# Patient Record
Sex: FEMALE | Race: OTHER
[De-identification: names, ages, dates, MRNs, and addresses within clinical notes are randomized per-mention and may not be internally consistent; named-entity substitution may affect disease eponyms.]

---

## 2018-02-26 ENCOUNTER — HOSPITAL ENCOUNTER (OUTPATIENT)
Dept: HOSPITAL 61 - 3 SO LND | Age: 23
Setting detail: OBSERVATION
Discharge: HOME | End: 2018-02-26
Attending: SPECIALIST | Admitting: SPECIALIST
Payer: SELF-PAY

## 2018-02-26 DIAGNOSIS — O26.893: Primary | ICD-10-CM

## 2018-02-26 DIAGNOSIS — Z3A.35: ICD-10-CM

## 2018-02-26 DIAGNOSIS — R10.9: ICD-10-CM

## 2018-02-26 LAB
ADD MAN DIFF?: NO
ALBUMIN SERPL-MCNC: 2.5 G/DL (ref 3.4–5)
ALBUMIN/GLOB SERPL: 0.7 {RATIO} (ref 1–1.7)
ALP SERPL-CCNC: 179 U/L (ref 46–116)
ALT (SGPT): 15 U/L (ref 14–59)
AMPHETAMINE/METHAMPHETAMINE: (no result)
ANION GAP SERPL CALC-SCNC: 10 MMOL/L (ref 6–14)
AST SERPL-CCNC: 16 U/L (ref 15–37)
BACTERIA,URINE: (no result) /HPF
BARBITURATES: (no result)
BASO #: 0.1 X10^3/UL (ref 0–0.2)
BASO %: 1 % (ref 0–3)
BENZODIAZEPINES: (no result)
BILIRUBIN,URINE: NEGATIVE
BLOOD UREA NITROGEN: 12 MG/DL (ref 7–20)
BUN/CREAT SERPL: 24 (ref 6–20)
CALCIUM: 8.3 MG/DL (ref 8.5–10.1)
CANNABINOIDS: (no result)
CHLORIDE: 106 MMOL/L (ref 98–107)
CLARITY,URINE: (no result)
CO2 SERPL-SCNC: 23 MMOL/L (ref 21–32)
COCAINE: (no result)
COLOR,URINE: YELLOW
CREAT SERPL-MCNC: 0.5 MG/DL (ref 0.6–1)
EOS #: 0.3 X10^3/UL (ref 0–0.7)
EOS %: 3 % (ref 0–3)
ETHANOL, URINE: (no result)
GFR SERPLBLD BASED ON 1.73 SQ M-ARVRAT: 154.3 ML/MIN
GLOBULIN SER-MCNC: 3.7 G/DL (ref 2.2–3.8)
GLUCOSE SERPL-MCNC: 80 MG/DL (ref 70–99)
GLUCOSE,URINE: NEGATIVE MG/DL
HCG SERPL-ACNC: 12.8 X10^3/UL (ref 4–11)
HEMATOCRIT: 34.2 % (ref 36–47)
HEMOGLOBIN: 11.7 G/DL (ref 12–15.5)
LYMPH #: 2.2 X10^3/UL (ref 1–4.8)
LYMPH %: 17 % (ref 24–48)
MEAN CORPUSCULAR HEMOGLOBIN: 32 PG (ref 25–35)
MEAN CORPUSCULAR HGB CONC: 34 G/DL (ref 31–37)
MEAN CORPUSCULAR VOLUME: 92 FL (ref 79–100)
METHADONE: (no result)
MONO #: 1 X10^3/UL (ref 0–1.1)
MONO %: 8 % (ref 0–9)
NEUT #: 9.2 X10^3UL (ref 1.8–7.7)
NEUT %: 72 % (ref 31–73)
NITRITE,URINE: POSITIVE
OPIATES: (no result)
PH,URINE: 6
PHENCYCLIDINE: (no result)
PLATELET COUNT: 227 X10^3/UL (ref 140–400)
POTASSIUM SERPL-SCNC: 3.8 MMOL/L (ref 3.5–5.1)
PROTEIN,URINE: NEGATIVE MG/DL
RBC,URINE: (no result) /HPF (ref 0–2)
RED BLOOD COUNT: 3.7 X10^6/UL (ref 3.5–5.4)
RED CELL DISTRIBUTION WIDTH: 12.5 % (ref 11.5–14.5)
SODIUM: 139 MMOL/L (ref 136–145)
SPECIFIC GRAVITY,URINE: 1.02
SQUAMOUS EPITHELIAL CELL,UR: (no result) /LPF
TOTAL BILIRUBIN: 0.1 MG/DL (ref 0.2–1)
TOTAL PROTEIN: 6.2 G/DL (ref 6.4–8.2)
UROBILINOGEN,URINE: 0.2 MG/DL
WBC,URINE: (no result) /HPF (ref 0–4)

## 2018-02-26 PROCEDURE — 36415 COLL VENOUS BLD VENIPUNCTURE: CPT

## 2018-02-26 PROCEDURE — 86593 SYPHILIS TEST NON-TREP QUANT: CPT

## 2018-02-26 PROCEDURE — 86850 RBC ANTIBODY SCREEN: CPT

## 2018-02-26 PROCEDURE — 96361 HYDRATE IV INFUSION ADD-ON: CPT

## 2018-02-26 PROCEDURE — 86900 BLOOD TYPING SEROLOGIC ABO: CPT

## 2018-02-26 PROCEDURE — 81001 URINALYSIS AUTO W/SCOPE: CPT

## 2018-02-26 PROCEDURE — 86706 HEP B SURFACE ANTIBODY: CPT

## 2018-02-26 PROCEDURE — 96365 THER/PROPH/DIAG IV INF INIT: CPT

## 2018-02-26 PROCEDURE — 87086 URINE CULTURE/COLONY COUNT: CPT

## 2018-02-26 PROCEDURE — 86762 RUBELLA ANTIBODY: CPT

## 2018-02-26 PROCEDURE — 76805 OB US >/= 14 WKS SNGL FETUS: CPT

## 2018-02-26 PROCEDURE — 80053 COMPREHEN METABOLIC PANEL: CPT

## 2018-02-26 PROCEDURE — 87186 SC STD MICRODIL/AGAR DIL: CPT

## 2018-02-26 PROCEDURE — 80307 DRUG TEST PRSMV CHEM ANLYZR: CPT

## 2018-02-26 PROCEDURE — 86901 BLOOD TYPING SEROLOGIC RH(D): CPT

## 2018-02-26 PROCEDURE — 85025 COMPLETE CBC W/AUTO DIFF WBC: CPT

## 2018-02-26 RX ADMIN — SODIUM CHLORIDE, SODIUM LACTATE, POTASSIUM CHLORIDE, AND CALCIUM CHLORIDE 1 MLS/HR: .6; .31; .03; .02 INJECTION, SOLUTION INTRAVENOUS at 13:31

## 2018-02-28 LAB — RUBELLA IGG ANTIBODY: 1.95 INDEX

## 2018-03-13 VITALS — DIASTOLIC BLOOD PRESSURE: 73 MMHG | SYSTOLIC BLOOD PRESSURE: 109 MMHG

## 2019-11-06 ENCOUNTER — HOSPITAL ENCOUNTER (OUTPATIENT)
Dept: HOSPITAL 61 - 3 SO LND | Age: 24
Setting detail: OBSERVATION
Discharge: HOME | End: 2019-11-06
Attending: OBSTETRICS & GYNECOLOGY | Admitting: OBSTETRICS & GYNECOLOGY
Payer: SELF-PAY

## 2019-11-06 DIAGNOSIS — R10.30: ICD-10-CM

## 2019-11-06 DIAGNOSIS — O99.89: ICD-10-CM

## 2019-11-06 DIAGNOSIS — Z3A.32: ICD-10-CM

## 2019-11-06 DIAGNOSIS — M25.519: ICD-10-CM

## 2019-11-06 DIAGNOSIS — O26.893: Primary | ICD-10-CM

## 2019-11-06 DIAGNOSIS — M54.9: ICD-10-CM

## 2019-11-06 LAB
APTT PPP: YELLOW S
BACTERIA #/AREA URNS HPF: (no result) /HPF
BILIRUB UR QL STRIP: NEGATIVE
FIBRINOGEN PPP-MCNC: CLEAR MG/DL
NITRITE UR QL STRIP: NEGATIVE
PH UR STRIP: 6 [PH]
PROT UR STRIP-MCNC: NEGATIVE MG/DL
RBC #/AREA URNS HPF: 0 /HPF (ref 0–2)
SQUAMOUS #/AREA URNS LPF: (no result) /LPF
UROBILINOGEN UR-MCNC: 0.2 MG/DL
WBC #/AREA URNS HPF: (no result) /HPF (ref 0–4)

## 2019-11-06 PROCEDURE — G0379 DIRECT REFER HOSPITAL OBSERV: HCPCS

## 2019-11-06 PROCEDURE — G0378 HOSPITAL OBSERVATION PER HR: HCPCS

## 2019-11-06 PROCEDURE — 87086 URINE CULTURE/COLONY COUNT: CPT

## 2019-11-06 PROCEDURE — 81001 URINALYSIS AUTO W/SCOPE: CPT

## 2019-12-04 ENCOUNTER — HOSPITAL ENCOUNTER (INPATIENT)
Dept: HOSPITAL 61 - 3 SO LND | Age: 24
LOS: 2 days | Discharge: HOME | End: 2019-12-06
Attending: OBSTETRICS & GYNECOLOGY | Admitting: OBSTETRICS & GYNECOLOGY
Payer: SELF-PAY

## 2019-12-04 VITALS — HEIGHT: 65 IN | WEIGHT: 174 LBS | BODY MASS INDEX: 28.99 KG/M2

## 2019-12-04 VITALS — DIASTOLIC BLOOD PRESSURE: 56 MMHG | SYSTOLIC BLOOD PRESSURE: 97 MMHG

## 2019-12-04 VITALS — SYSTOLIC BLOOD PRESSURE: 92 MMHG | DIASTOLIC BLOOD PRESSURE: 54 MMHG

## 2019-12-04 VITALS — SYSTOLIC BLOOD PRESSURE: 100 MMHG | DIASTOLIC BLOOD PRESSURE: 61 MMHG

## 2019-12-04 VITALS — DIASTOLIC BLOOD PRESSURE: 55 MMHG | SYSTOLIC BLOOD PRESSURE: 91 MMHG

## 2019-12-04 DIAGNOSIS — Z3A.36: ICD-10-CM

## 2019-12-04 LAB
BASOPHILS # BLD AUTO: 0.1 X10^3/UL (ref 0–0.2)
BASOPHILS NFR BLD: 1 % (ref 0–3)
EOSINOPHIL NFR BLD: 0.3 X10^3/UL (ref 0–0.7)
EOSINOPHIL NFR BLD: 3 % (ref 0–3)
ERYTHROCYTE [DISTWIDTH] IN BLOOD BY AUTOMATED COUNT: 12.8 % (ref 11.5–14.5)
HCT VFR BLD CALC: 37.6 % (ref 36–47)
HGB BLD-MCNC: 12.7 G/DL (ref 12–15.5)
LYMPHOCYTES # BLD: 3.5 X10^3/UL (ref 1–4.8)
LYMPHOCYTES NFR BLD AUTO: 29 % (ref 24–48)
MCH RBC QN AUTO: 32 PG (ref 25–35)
MCHC RBC AUTO-ENTMCNC: 34 G/DL (ref 31–37)
MCV RBC AUTO: 93 FL (ref 79–100)
MONO #: 1.2 X10^3/UL (ref 0–1.1)
MONOCYTES NFR BLD: 10 % (ref 0–9)
NEUT #: 6.8 X10^3/UL (ref 1.8–7.7)
NEUTROPHILS NFR BLD AUTO: 57 % (ref 31–73)
PLATELET # BLD AUTO: 237 X10^3/UL (ref 140–400)
RBC # BLD AUTO: 4.03 X10^6/UL (ref 3.5–5.4)
WBC # BLD AUTO: 11.9 X10^3/UL (ref 4–11)

## 2019-12-04 PROCEDURE — 36415 COLL VENOUS BLD VENIPUNCTURE: CPT

## 2019-12-04 PROCEDURE — 87340 HEPATITIS B SURFACE AG IA: CPT

## 2019-12-04 PROCEDURE — 86850 RBC ANTIBODY SCREEN: CPT

## 2019-12-04 PROCEDURE — G0378 HOSPITAL OBSERVATION PER HR: HCPCS

## 2019-12-04 PROCEDURE — 90715 TDAP VACCINE 7 YRS/> IM: CPT

## 2019-12-04 PROCEDURE — 88307 TISSUE EXAM BY PATHOLOGIST: CPT

## 2019-12-04 PROCEDURE — 90471 IMMUNIZATION ADMIN: CPT

## 2019-12-04 PROCEDURE — 86900 BLOOD TYPING SEROLOGIC ABO: CPT

## 2019-12-04 PROCEDURE — 86762 RUBELLA ANTIBODY: CPT

## 2019-12-04 PROCEDURE — 86901 BLOOD TYPING SEROLOGIC RH(D): CPT

## 2019-12-04 PROCEDURE — 86592 SYPHILIS TEST NON-TREP QUAL: CPT

## 2019-12-04 PROCEDURE — 90686 IIV4 VACC NO PRSV 0.5 ML IM: CPT

## 2019-12-04 PROCEDURE — 85025 COMPLETE CBC W/AUTO DIFF WBC: CPT

## 2019-12-04 RX ADMIN — IBUPROFEN PRN MG: 400 TABLET ORAL at 20:31

## 2019-12-04 RX ADMIN — OXYCODONE HYDROCHLORIDE AND ACETAMINOPHEN PRN TAB: 5; 325 TABLET ORAL at 20:35

## 2019-12-04 RX ADMIN — IBUPROFEN PRN MG: 400 TABLET ORAL at 06:10

## 2019-12-04 RX ADMIN — OXYCODONE HYDROCHLORIDE AND ACETAMINOPHEN PRN TAB: 5; 325 TABLET ORAL at 15:00

## 2019-12-04 NOTE — PDOC1
OB - History


Hx of Present Pregnancy


Prenatal Care:  Limited Care


Ultrasounds:  Normal mid trimester US


Obstetrical Complications:  None


Medical Complications:  None





Past Family/Social History


*


Past Medical, Surgical, Family and Obstetric Histories reviewed from prenatal 

chart.


Blood Type:  Unknown


Rubella:  Unknown


RPR/VDRL:  Unknown


GBS Status:  Unknown


HBsAG:  Unknown





OB - Chief Complaint & HPI


Date of Admission:


Date of Admission:  Dec 4, 2019 at 04:43


Chief Complaint/History


:  4


Para:  3


EGA:  36


Reason for admission:   labor


Admission Nurse Assessment Rev:  Yes





OB - Admission Exam


Physical Exam


HEENT:  Normal


Heart:  Regular Rate


Lungs:  Clear, Equal


Abdomen:  Gravid, Non tender, Soft


Extremities:  Edema


Reflexes:  Normal


Cervical Dilatation:  9cm


Effacement:  100%


Station:  -1


Membranes:  Intact


Fetal Heart Rate:  Normal


Accelerations:  Accelerations Present


Decelerations:  No decelerations


Contractions on Admission:  < 5 Minutes Apart


Intensity:  Firm


Text


A: 36 wks IUP


    PTL 


    GBS unknown


P: Admit PTL management.











SANTOS MIDDLETON Jr, MD           Dec 4, 2019 05:34

## 2019-12-04 NOTE — PDOC
VAGINAL DELIVERY


DATE


DATE: 19 


TIME: 05:35


:  4


Para:  4


EGA:  36


VAGINAL DELIVERY:  VTX


VACCUM ASSISTED:  Yes


PLACENTA:  Spontaneous


APGAR


8/9


SEX:  Male


WEIGHT


Weight [ 4 lbs. 12 oz ]


Nuchal Cord:  No


Amniotic Fluid:  Clear


PAIN:  Natural


EPISIOTOMY:  No


EXTENSION:  No


EBL


300 ml


COMPLICATIONS


none


CONDITION


pt. stable


Signs of Intrauterine Infectio:  None


Shoulder Dystocia:  No











SANTOS MIDDLETON Jr, MD           Dec 4, 2019 05:36

## 2019-12-05 VITALS — DIASTOLIC BLOOD PRESSURE: 68 MMHG | SYSTOLIC BLOOD PRESSURE: 102 MMHG

## 2019-12-05 VITALS — DIASTOLIC BLOOD PRESSURE: 64 MMHG | SYSTOLIC BLOOD PRESSURE: 106 MMHG

## 2019-12-05 VITALS — SYSTOLIC BLOOD PRESSURE: 104 MMHG | DIASTOLIC BLOOD PRESSURE: 63 MMHG

## 2019-12-05 LAB
BASOPHILS # BLD AUTO: 0.1 X10^3/UL (ref 0–0.2)
BASOPHILS NFR BLD: 1 % (ref 0–3)
EOSINOPHIL NFR BLD: 0.5 X10^3/UL (ref 0–0.7)
EOSINOPHIL NFR BLD: 4 % (ref 0–3)
ERYTHROCYTE [DISTWIDTH] IN BLOOD BY AUTOMATED COUNT: 13 % (ref 11.5–14.5)
HCT VFR BLD CALC: 36.3 % (ref 36–47)
HGB BLD-MCNC: 12.6 G/DL (ref 12–15.5)
LYMPHOCYTES # BLD: 3.8 X10^3/UL (ref 1–4.8)
LYMPHOCYTES NFR BLD AUTO: 33 % (ref 24–48)
MCH RBC QN AUTO: 32 PG (ref 25–35)
MCHC RBC AUTO-ENTMCNC: 35 G/DL (ref 31–37)
MCV RBC AUTO: 93 FL (ref 79–100)
MONO #: 1 X10^3/UL (ref 0–1.1)
MONOCYTES NFR BLD: 9 % (ref 0–9)
NEUT #: 6.3 X10^3/UL (ref 1.8–7.7)
NEUTROPHILS NFR BLD AUTO: 54 % (ref 31–73)
PLATELET # BLD AUTO: 230 X10^3/UL (ref 140–400)
RBC # BLD AUTO: 3.9 X10^6/UL (ref 3.5–5.4)
WBC # BLD AUTO: 11.7 X10^3/UL (ref 4–11)

## 2019-12-05 RX ADMIN — IBUPROFEN PRN MG: 400 TABLET ORAL at 06:27

## 2019-12-05 RX ADMIN — OXYCODONE HYDROCHLORIDE AND ACETAMINOPHEN PRN TAB: 5; 325 TABLET ORAL at 06:29

## 2019-12-05 RX ADMIN — OXYCODONE HYDROCHLORIDE AND ACETAMINOPHEN PRN TAB: 5; 325 TABLET ORAL at 20:29

## 2019-12-05 RX ADMIN — IBUPROFEN PRN MG: 400 TABLET ORAL at 18:10

## 2019-12-05 RX ADMIN — ACETAMINOPHEN PRN MG: 325 TABLET, FILM COATED ORAL at 20:29

## 2019-12-05 RX ADMIN — DOCUSATE SODIUM PRN MG: 100 CAPSULE, LIQUID FILLED ORAL at 18:10

## 2019-12-05 NOTE — PDOC
OB Progress Note


Date of Service


19


Time of Evaluation


0820


Notes


Pt. feeling well.  No complaints.


Lab





Laboratory Tests








Test


 19


05:05 19


05:12


 


White Blood Count


 11.9 x10^3/uL


(4.0-11.0) 11.7 x10^3/uL


(4.0-11.0)


 


Red Blood Count


 4.03 x10^6/uL


(3.50-5.40) 3.90 x10^6/uL


(3.50-5.40)


 


Hemoglobin


 12.7 g/dL


(12.0-15.5) 12.6 g/dL


(12.0-15.5)


 


Hematocrit


 37.6 %


(36.0-47.0) 36.3 %


(36.0-47.0)


 


Mean Corpuscular Volume 93 fL ()  93 fL () 


 


Mean Corpuscular Hemoglobin 32 pg (25-35)  32 pg (25-35) 


 


Mean Corpuscular Hemoglobin


Concent 34 g/dL


(31-37) 35 g/dL


(31-37)


 


Red Cell Distribution Width


 12.8 %


(11.5-14.5) 13.0 %


(11.5-14.5)


 


Platelet Count


 237 x10^3/uL


(140-400) 230 x10^3/uL


(140-400)


 


Neutrophils (%) (Auto) 57 % (31-73)  54 % (31-73) 


 


Lymphocytes (%) (Auto) 29 % (24-48)  33 % (24-48) 


 


Monocytes (%) (Auto) 10 % (0-9)  9 % (0-9) 


 


Eosinophils (%) (Auto) 3 % (0-3)  4 % (0-3) 


 


Basophils (%) (Auto) 1 % (0-3)  1 % (0-3) 


 


Neutrophils # (Auto)


 6.8 x10^3/uL


(1.8-7.7) 6.3 x10^3/uL


(1.8-7.7)


 


Lymphocytes # (Auto)


 3.5 x10^3/uL


(1.0-4.8) 3.8 x10^3/uL


(1.0-4.8)


 


Monocytes # (Auto)


 1.2 x10^3/uL


(0.0-1.1) 1.0 x10^3/uL


(0.0-1.1)


 


Eosinophils # (Auto)


 0.3 x10^3/uL


(0.0-0.7) 0.5 x10^3/uL


(0.0-0.7)


 


Basophils # (Auto)


 0.1 x10^3/uL


(0.0-0.2) 0.1 x10^3/uL


(0.0-0.2)


 


Treponema pallidum Antibody


 Nonreactive


(Nonreactive) 





 


Hepatitis B Surface Antigen


 Nonreactive


(Nonreactive) 





 


Rubella IgG Antibody


 2.04 index


(Immune >0.99) 











Laboratory Tests








Test


 19


05:12


 


White Blood Count


 11.7 x10^3/uL


(4.0-11.0)


 


Red Blood Count


 3.90 x10^6/uL


(3.50-5.40)


 


Hemoglobin


 12.6 g/dL


(12.0-15.5)


 


Hematocrit


 36.3 %


(36.0-47.0)


 


Mean Corpuscular Volume 93 fL () 


 


Mean Corpuscular Hemoglobin 32 pg (25-35) 


 


Mean Corpuscular Hemoglobin


Concent 35 g/dL


(31-37)


 


Red Cell Distribution Width


 13.0 %


(11.5-14.5)


 


Platelet Count


 230 x10^3/uL


(140-400)


 


Neutrophils (%) (Auto) 54 % (31-73) 


 


Lymphocytes (%) (Auto) 33 % (24-48) 


 


Monocytes (%) (Auto) 9 % (0-9) 


 


Eosinophils (%) (Auto) 4 % (0-3) 


 


Basophils (%) (Auto) 1 % (0-3) 


 


Neutrophils # (Auto)


 6.3 x10^3/uL


(1.8-7.7)


 


Lymphocytes # (Auto)


 3.8 x10^3/uL


(1.0-4.8)


 


Monocytes # (Auto)


 1.0 x10^3/uL


(0.0-1.1)


 


Eosinophils # (Auto)


 0.5 x10^3/uL


(0.0-0.7)


 


Basophils # (Auto)


 0.1 x10^3/uL


(0.0-0.2)








Medications





Current Medications


Sodium Chloride (Normal Saline Flush) 3 ml QSHIFT  PRN IV AFTER MEDS AND BLOOD 

DRAWS;  Start 19 at 05:00


Ringer's Solution 1,000 ml @  125 mls/hr Q8H IV  Last administered on 19at 

06:11;  Start 19 at 05:00


Nalbuphine HCl (Nubain) 10 mg PRN Q1HR  PRN IV Severe labor pain;  Start 19

at 05:00;  Stop 19 at 16:10;  Status DC


Butorphanol Tartrate (Stadol) 2 mg PRN Q1HR  PRN IV Severe labor pain;  Start 

19 at 05:00;  Stop 19 at 16:10;  Status DC


Fentanyl Citrate (Fentanyl 2ml Vial) 100 mcg PRN Q20MIN  PRN IV Labor pain;  St

art 19 at 05:00;  Stop 19 at 16:10;  Status DC


Acetaminophen (Tylenol) 650 mg PRN Q6HRS  PRN PO MILD PAIN / TEMP;  Start 

19 at 05:00;  Stop 19 at 16:09;  Status DC


Ondansetron HCl (Zofran) 4 mg PRN Q4HRS  PRN IV NAUSEA/VOMITING;  Start 19 

at 05:00


Terbutaline Sulfate (Brethine) 0.25 mg 1X PRN  PRN SQ SEE COMMENTS;  Start 

19 at 05:00;  Stop 19 at 04:59;  Status DC


Lidocaine HCl (Xylocaine 1% Pf 30ml Vial) 30 ml 1X PRN  PRN INJ SEE COMMENTS;  

Start 19 at 05:00;  Stop 19 at 04:59


Oxytocin/Sodium Chloride 500 ml @ 0 mls/hr CONT  PRN IV SEE I/O RECORD;  Start 

19 at 05:00


Oxytocin/Sodium Chloride 500 ml @ 0 mls/hr CONT PRN  PRN IV Post delivery 

bleeding;  Start 19 at 05:00


Penicillin G Potassium [id number] unit/Dextrose 100 ml @  100 mls/hr 1X  ONCE IV  

Last administered on 19at 06:12;  Start 19 at 05:00;  Stop 19 at 

05:59;  Status DC


Penicillin G Potassium [id number] unit/Dextrose 50 ml @  100 mls/hr Q4H IV ;  Start

19 at 09:00;  Stop 19 at 16:11;  Status DC


Sodium Chloride (Normal Saline Flush) 10 ml QSHIFT  PRN IV AFTER MEDS AND BLOOD 

DRAWS;  Start 19 at 05:45


Oxytocin/Sodium Chloride 500 ml @  62.5 mls/hr CONT  PRN IV SEE I/O RECORD;  

Start 19 at 05:45;  Stop 19 at 13:44;  Status DC


Acetaminophen (Tylenol) 650 mg PRN Q6HRS  PRN PO MILD PAIN / TEMP;  Start 

19 at 05:45


Ibuprofen (Motrin) 800 mg PRN Q8HRS  PRN PO INFLAMMATION/PAIN PREVENTION Last 

administered on 19at 06:27;  Start 19 at 05:45


Docusate Sodium (Colace) 100 mg PRN BID  PRN PO HARD STOOLS;  Start 19 at 

05:45


Magnesium Hydroxide (Milk Of Magnesia) 2,400 mg PRN DAILY  PRN PO CONSTIPATION; 

Start 19 at 05:45


Al Hydroxide/Mg Hydroxide (Mylanta Plus Xs) 30 ml PRN Q4HRS  PRN PO HEARTBURN / 

GAS;  Start 19 at 05:45


Simethicone (Gas-X) 80 mg PRN AFTMEALHC  PRN PO GAS / BLOATING;  Start 19 

at 05:45


Diphenhydramine HCl (Benadryl) 25 mg PRN Q6HRS  PRN PO ITCHING;  Start 19 

at 05:45


Benzocaine (Americaine) 1 spray PRN QID  PRN TP TOPICAL PAIN Last administered 

on 19at 06:13;  Start 19 at 05:45


Phenyleph/Shark Oil/Min Oil/Petrol (Preparation H) 1 ravi PRN QID  PRN RC RECTAL 

PAIN;  Start 19 at 05:45


Hydrocortisone (Cortaid) 1 ravi PRN QID  PRN TP PERINEAL PAIN;  Start 19 at 

05:45


Ferrous Sulfate (Feosol) 325 mg BIDWMEALS PO ;  Start 19 at 08:00


Zolpidem Tartrate (Ambien) 5 mg PRN QHS  PRN PO INSOMNIA, MAY REPEAT X1;  Start 

19 at 05:45


Info (Do NOT chart on this placeholder) 1 ea 1X PRN  PRN MC SEE COMMENTS;  Start

19 at 05:45


Info (Do NOT chart on this placeholder) 1 ea 1X PRN  PRN MC SEE COMMENTS;  Start

19 at 05:45


Oxycodone/ Acetaminophen (Percocet 5/325) 2 tab PRN Q4HRS  PRN PO MODERATE PAIN,

SEVERE PAIN Last administered on 19at 06:29;  Start 19 at 05:45





Active Scripts


Active


Ibuprofen 800 Mg Tablet 800 Mg PO PRN Q6HRS PRN


Reported


Prenatal Tablet (Pnv Cmb#95/Ferrous Fumarate/Fa) 1 Each Tablet 1 Tab PO DAILY


Exam


Abd: soft, non tender, fundus firm


Assessment


PPD#1 s/p 


Plan of Care:  Continue current Tx, Mgmt











SANTOS MIDDLETON Jr, MD           Dec 5, 2019 08:20

## 2019-12-06 VITALS — SYSTOLIC BLOOD PRESSURE: 110 MMHG | DIASTOLIC BLOOD PRESSURE: 68 MMHG

## 2019-12-06 VITALS — DIASTOLIC BLOOD PRESSURE: 62 MMHG | SYSTOLIC BLOOD PRESSURE: 111 MMHG

## 2019-12-06 RX ADMIN — IBUPROFEN PRN MG: 400 TABLET ORAL at 06:24

## 2019-12-06 RX ADMIN — ACETAMINOPHEN PRN MG: 325 TABLET, FILM COATED ORAL at 08:12

## 2019-12-06 RX ADMIN — DOCUSATE SODIUM PRN MG: 100 CAPSULE, LIQUID FILLED ORAL at 08:11

## 2019-12-06 NOTE — PATHOLOGY
[facility] Accession Number: [id number]

.                                                                01

Material submitted:                                        .

placenta - PLACENTA WITH CORD

.                                                                01

Clinical history:                                          .

Gestational age 36.6 weeks; prematurity; precipitous delivery; SGA; ;

EDC 19; Apgar 7, 8, 9

.                                                                02

**********************************************************************

Diagnosis:

478 gram late  placenta of an estimated 36.6 weeks gestation with

attached membranes and umbilical cord:

- Intervillous thrombi.

- Congestion of chorionic villi with focally increased syncytial knots.

LBQ  2019  1656 Local

**********************************************************************

.                                                                02

Comment:

There is no evidence of an acute chorioamnionitis or villitis.  There are

no infarcts.

(JPM/db; 2019)

.                                                                02

Electronically signed:                                     .

Jorge Alberto Mcmahon MD, Pathologist

NPI- [id number]

.                                                                01

Gross description:                                         .

The specimen is received in formalin, labeled "Joe Romero,

placenta".  Received is a rodrigues placenta with attached fetal membranes

and umbilical cord with a trimmed placental weight of 478 g and measuring

16.2 x 13.8 x 3.8 cm in greatest dimensions.  The fetal membranes are pale

tan, translucent, and the site of membrane rupture is 6.1 cm from the

placental margin.  The fetal surface is intact displaying a normal

arborizing vasculature pattern.  The trivascular umbilical cord measures

49.5 cm in length by 1.1 cm in diameter and inserts eccentrically, 1.5 cm

from the closest placental margin.  The umbilical cord is pale tan to

blue-gray in appearance with moderate to severe helical twisting.  The

maternal surface is intact and complete; a slight amount of adherent blood

coagulum is seen on the surface.  Sectioning reveals red-brown cut

surfaces displaying two lesions, the largest of which is hemorrhagic in

appearance, measuring 0.9 and 1.8 cm.  The lesions encompass less than 5%

of the total placental volume.  The specimen is submitted representatively

as follows:

.

A1     proximal umbilical cord and surface vessels

A2     umbilical cord and membrane roll

A3-A4  representative sections of maternal surface to include each lesion.

(CAA; 2019)

QAC/QAC  2019  1602 Local

.                                                                02

Pathologist provided ICD-10:

O43.893, Z37.0, Z3A.36

.                                                                02

CPT                                                        .

171617

Specimen Comment: A courtesy copy of this report has been sent to [phone]

Specimen Comment: Report sent to 

***Performed at:  01

   [facility]

   [address], [place]  [id number]

   MD Eliezer Blandon MD Phone:  [phone]

***Performed at:  02

   [facility]

   [address] [place], [place]  [id number]

   MD Jorge Alberto Mcmahon MD Phone:  [phone]

## 2019-12-06 NOTE — NUR
Discharge

Discharge instructions given to patient at this time, no questions or concerns. Baby no 
discharged at this time, boarding policy given to patient, signed and placed in chart.

## 2019-12-06 NOTE — DISCH
DISCHARGE INSTRUCTIONS


Condition on Discharge


Condition on Discharge:  Stable





Activity After Discharge


Activity Instructions for Disc:  Activity as tolerated


Lifting Instructions after Dis:  No heavy lifting


Driving Instructions after Dis:  Do not drive today





Diet after Discharge


Diet after Discharge:  Regular





Contacting the [facility] after DC


Call your doctor for:  If your condition worsens





Follow-Up


Follow up with:  Ashely in 6 wks











SANTOS MIDDLETON Jr, MD           Dec 6, 2019 16:28

## 2019-12-06 NOTE — PDOC3
OB DISCHARGE SUMMARY


DATE OF ADMISSION:  


12/4/19


DATE OF DISCHARGE:  


12/6/19


REASON FOR ADMISSION:  Onset of labor


INTRAPARTUM PROCEDURES:  Spontanous Vag Deliv


DISCHARGE DIAGNOSIS:  Term Pregnancy Delivered


DISCHARGE INFORMATION:  Activity (ad len), Diet (regular), Instructions (pelvic 

rest x 6 wks)


HOSPITAL COURSE


Term gestation delivered vaginally without complications.











SANTOS MIDDLETON Jr, MD           Dec 6, 2019 16:25

## 2021-09-29 ENCOUNTER — HOSPITAL ENCOUNTER (EMERGENCY)
Dept: HOSPITAL 61 - ER | Age: 26
Discharge: HOME | End: 2021-09-29
Payer: SELF-PAY

## 2021-09-29 VITALS — DIASTOLIC BLOOD PRESSURE: 83 MMHG | SYSTOLIC BLOOD PRESSURE: 134 MMHG

## 2021-09-29 VITALS — BODY MASS INDEX: 34.89 KG/M2 | HEIGHT: 65 IN | WEIGHT: 209.44 LBS

## 2021-09-29 DIAGNOSIS — R22.42: ICD-10-CM
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
## 2021-09-29 DIAGNOSIS — S05.12XA: Primary | ICD-10-CM
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
## 2021-09-29 PROCEDURE — 70486 CT MAXILLOFACIAL W/O DYE: CPT

## 2021-09-29 PROCEDURE — 70450 CT HEAD/BRAIN W/O DYE: CPT

## 2021-09-29 NOTE — PHYS DOC
Past Medical History


Past Medical History:  No Pertinent History


Past Surgical History:  No Surgical History


Smoking Status:  Never Smoker


Alcohol Use:  Rarely


Drug Use:  None





General Adult


EDM:


Chief Complaint:  EYE PROBLEMS





HPI:


HPI:





Patient is a 26 year old Turkmen speaking female who presents with left orbital 

swelling and pain.  Patient states that she was walking around a pool last night

and she fell.  She states that it was dark and she was not able to see well so 

she tripped and hit her face on the border of the pool.  She rates her pain 7/10

limited to the left periorbital area. Patient denies changes in visual acuity 

and visual field deficits.  She denies loss of consciousness, nausea, vomiting 

and other injury.





Review of Systems:


Review of Systems:


ROS negative except as mentioned in HPI.





Heart Score:


C/O Chest Pain:  No





Current Medications:





Current Medications








 Medications


  (Trade)  Dose


 Ordered  Sig/Sharmila  Start Time


 Stop Time Status Last Admin


Dose Admin


 


 Ibuprofen


  (Motrin)  800 mg  1X  ONCE  9/29/21 17:30


 9/29/21 17:31 DC 9/29/21 17:36


800 MG











Allergies:


Allergies:





Allergies








Coded Allergies Type Severity Reaction Last Updated Verified


 


  No Known Drug Allergies    10/9/17 No











Physical Exam:


PE:





Constitutional: Well developed, well nourished, no acute distress, non-toxic 

appearance. 


HENT: Normocephalic and symmetrical, bilateral external ears normal, oropharynx 

moist, no oral exudates, nose normal. 


Eyes: Visual acuity intact.  Bilateral PERRLA, EOMI (pain with superior lateral 

movement to left eye).  Left eye with periorbital swelling and ecchymosis, no 

hyphema. Right eye without swelling, conjunctiva normal.


Neck: Normal range of motion, no step-offs, no bony tenderness, supple, no 

stridor.


Cardiovascular: Heart rate regular rhythm, no murmur.


Lungs & Thorax:  Bilateral breath sounds clear to auscultation.


Back: No step-offs, no bony tenderness.


Extremities: No tenderness, no cyanosis, no clubbing, ROM intact, no edema.


Neurologic: Alert and oriented x3, normal motor function, normal sensory 

function, no focal deficits noted.





Current Patient Data:


Vital Signs:





                                   Vital Signs








  Date Time  Temp Pulse Resp B/P (MAP) Pulse Ox O2 Delivery O2 Flow Rate FiO2


 


9/29/21 16:13 98.1 103 18 134/83 (100) 98 Room Air  





 98.1       











Radiology/Procedures:


Radiology/Procedures:


PROCEDURE: CT HEAD AND MAXILLOFACIAL WO





CT HEAD AND MAXILLOFACIAL WO





History: Reason: face trauma / Spl. Instructions:  / History: . Pain





Comparison: None.





Technique: Noncontrast CT imaging was performed of the head and maxillofacial. 

Coronal and sagittal reconstructions were performed. 





Exposure: One or more of the following individualized dose reduction techniques 

were utilized for this examination:  


1. Automated exposure control  


2. Adjustment of the mA and/or kV according to patient size  


3. Use of iterative reconstruction technique.





Findings: 


Head CT: No intracranial hemorrhage. No mass effect. No hydrocephalus.  Extra-

axial spaces are unremarkable.





Maxillofacial CT: Left preseptal periorbital facial soft tissue swelling.





Orbits are unremarkable. 





Left maxillary sinus mucosal thickening. No acute calvarial fracture.





Impression:


Head CT:


1.  No acute intracranial abnormality. 





Maxillofacial CT:


1.  No acute maxillofacial fracture.


2.  Left preseptal periorbital and facial soft tissue swelling.





Electronically signed by: Samuel Reyez DO (9/29/2021 6:03 PM) [facility]





Course & Med Decision Making:


Course & Med Decision Making


Pertinent Labs and Imaging studies reviewed. (See chart for details)





Due to nature of initial injury with significant swelling to the periorbital 

area and pain with eye movement, maxillofacial and head CT ordered to rule out 

blowout fracture,ocular muscle entrapment, globe rupture, retrobulbar hematoma.





Patient is reassured that there are no bony injuries or internal injury.  She 

has instruction to follow-up with ophthalmology tomorrow to schedule an 

appointment.  In the meantime, she is instructed to use ice packs and 

over-the-counter NSAIDs patient understands and is agreeable to discharge plan.





Dragon Disclaimer:


Dragon Disclaimer:


This electronic medical record was generated, in whole or in part, using a voice

recognition dictation system.





Departure


Departure


Impression:  


   Primary Impression:  


   Contusion of left orbit


   Qualified Codes:  S05.12XA - Contusion of eyeball and orbital tissues, left 

   eye, initial encounter


   Additional Impression:  


   Orbital swelling


Disposition:  01 HOME / SELF CARE / HOMELESS


Condition:  STABLE


Referrals:  


UNKNOWN PCP NAME (PCP)


Patient Instructions:  Eye Contusion, Easy-to-Read





Additional Instructions:  


Por favor llama al oftalmologo en la manana para mas evaluacion y tratamiento. 

Si tu dolor o vision empeora, regresa a la igor de emergencia. Balbina 800mg 

ibuprofen/advil cada 6 horas para la inflamacion y dolor. 





[facility]


[address] [address]


[phone]











JAYNA CALDWELL              Sep 29, 2021 18:28

## 2021-09-29 NOTE — RAD
CT HEAD AND MAXILLOFACIAL WO



History: Reason: face trauma / Spl. Instructions:  / History: . Pain



Comparison: None.



Technique: Noncontrast CT imaging was performed of the head and maxillofacial. Coronal and sagittal r
econstructions were performed. 



Exposure: One or more of the following individualized dose reduction techniques were utilized for thi
s examination:  

1. Automated exposure control  

2. Adjustment of the mA and/or kV according to patient size  

3. Use of iterative reconstruction technique.



Findings: 

Head CT: No intracranial hemorrhage. No mass effect. No hydrocephalus.  Extra-axial spaces are unrema
rkable.



Maxillofacial CT: Left preseptal periorbital facial soft tissue swelling.



Orbits are unremarkable. 



Left maxillary sinus mucosal thickening. No acute calvarial fracture.



Impression:

Head CT:

1.  No acute intracranial abnormality. 



Maxillofacial CT:

1.  No acute maxillofacial fracture.

2.  Left preseptal periorbital and facial soft tissue swelling.



Electronically signed by: Samuel Reyez DO (9/29/2021 6:03 PM) [facility]

## 2022-01-23 ENCOUNTER — HOSPITAL ENCOUNTER (EMERGENCY)
Dept: HOSPITAL 61 - ER | Age: 27
Discharge: HOME | End: 2022-01-23
Payer: SELF-PAY

## 2022-01-23 VITALS — SYSTOLIC BLOOD PRESSURE: 110 MMHG | DIASTOLIC BLOOD PRESSURE: 70 MMHG

## 2022-01-23 VITALS — BODY MASS INDEX: 30.58 KG/M2 | WEIGHT: 190.26 LBS | HEIGHT: 66 IN

## 2022-01-23 DIAGNOSIS — O23.42: Primary | ICD-10-CM

## 2022-01-23 DIAGNOSIS — N39.0: ICD-10-CM

## 2022-01-23 DIAGNOSIS — Z3A.19: ICD-10-CM

## 2022-01-23 DIAGNOSIS — R10.12: ICD-10-CM

## 2022-01-23 LAB
ALBUMIN SERPL-MCNC: 2.9 G/DL (ref 3.4–5)
ALBUMIN/GLOB SERPL: 0.7 {RATIO} (ref 1–1.7)
ALP SERPL-CCNC: 114 U/L (ref 46–116)
ALT SERPL-CCNC: 22 U/L (ref 14–59)
ANION GAP SERPL CALC-SCNC: 12 MMOL/L (ref 6–14)
APTT PPP: YELLOW S
AST SERPL-CCNC: 11 U/L (ref 15–37)
BACTERIA #/AREA URNS HPF: (no result) /HPF
BASOPHILS # BLD AUTO: 0.1 X10^3/UL (ref 0–0.2)
BASOPHILS NFR BLD: 1 % (ref 0–3)
BILIRUB SERPL-MCNC: 0.2 MG/DL (ref 0.2–1)
BILIRUB UR QL STRIP: NEGATIVE
BUN SERPL-MCNC: 8 MG/DL (ref 7–20)
BUN/CREAT SERPL: 13 (ref 6–20)
CALCIUM SERPL-MCNC: 9.4 MG/DL (ref 8.5–10.1)
CHLORIDE SERPL-SCNC: 106 MMOL/L (ref 98–107)
CO2 SERPL-SCNC: 22 MMOL/L (ref 21–32)
CREAT SERPL-MCNC: 0.6 MG/DL (ref 0.6–1)
EOSINOPHIL NFR BLD: 0.2 X10^3/UL (ref 0–0.7)
EOSINOPHIL NFR BLD: 3 % (ref 0–3)
ERYTHROCYTE [DISTWIDTH] IN BLOOD BY AUTOMATED COUNT: 12.3 % (ref 11.5–14.5)
FIBRINOGEN PPP-MCNC: (no result) MG/DL
GFR SERPLBLD BASED ON 1.73 SQ M-ARVRAT: 120.8 ML/MIN
GLUCOSE SERPL-MCNC: 92 MG/DL (ref 70–99)
HCT VFR BLD CALC: 34.9 % (ref 36–47)
HGB BLD-MCNC: 12.1 G/DL (ref 12–15.5)
LIPASE: 92 U/L (ref 73–393)
LYMPHOCYTES # BLD: 1.6 X10^3/UL (ref 1–4.8)
LYMPHOCYTES NFR BLD AUTO: 18 % (ref 24–48)
MCH RBC QN AUTO: 32 PG (ref 25–35)
MCHC RBC AUTO-ENTMCNC: 35 G/DL (ref 31–37)
MCV RBC AUTO: 92 FL (ref 79–100)
MONO #: 0.6 X10^3/UL (ref 0–1.1)
MONOCYTES NFR BLD: 7 % (ref 0–9)
NEUT #: 6.5 X10^3/UL (ref 1.8–7.7)
NEUTROPHILS NFR BLD AUTO: 72 % (ref 31–73)
NITRITE UR QL STRIP: NEGATIVE
PH UR STRIP: 7 [PH]
PLATELET # BLD AUTO: 273 X10^3/UL (ref 140–400)
POTASSIUM SERPL-SCNC: 3.6 MMOL/L (ref 3.5–5.1)
PROT SERPL-MCNC: 6.9 G/DL (ref 6.4–8.2)
PROT UR STRIP-MCNC: NEGATIVE MG/DL
RBC # BLD AUTO: 3.81 X10^6/UL (ref 3.5–5.4)
RBC #/AREA URNS HPF: 0 /HPF (ref 0–2)
SODIUM SERPL-SCNC: 140 MMOL/L (ref 136–145)
UROBILINOGEN UR-MCNC: 0.2 MG/DL
WBC # BLD AUTO: 9 X10^3/UL (ref 4–11)
WBC #/AREA URNS HPF: (no result) /HPF (ref 0–4)

## 2022-01-23 PROCEDURE — 83690 ASSAY OF LIPASE: CPT

## 2022-01-23 PROCEDURE — 81025 URINE PREGNANCY TEST: CPT

## 2022-01-23 PROCEDURE — 87086 URINE CULTURE/COLONY COUNT: CPT

## 2022-01-23 PROCEDURE — 96361 HYDRATE IV INFUSION ADD-ON: CPT

## 2022-01-23 PROCEDURE — 85025 COMPLETE CBC W/AUTO DIFF WBC: CPT

## 2022-01-23 PROCEDURE — 96374 THER/PROPH/DIAG INJ IV PUSH: CPT

## 2022-01-23 PROCEDURE — 84702 CHORIONIC GONADOTROPIN TEST: CPT

## 2022-01-23 PROCEDURE — 76815 OB US LIMITED FETUS(S): CPT

## 2022-01-23 PROCEDURE — 80053 COMPREHEN METABOLIC PANEL: CPT

## 2022-01-23 PROCEDURE — 81001 URINALYSIS AUTO W/SCOPE: CPT

## 2022-01-23 PROCEDURE — 99285 EMERGENCY DEPT VISIT HI MDM: CPT

## 2022-01-23 PROCEDURE — 36415 COLL VENOUS BLD VENIPUNCTURE: CPT

## 2022-01-23 NOTE — PHYS DOC
Past Medical History


Past Medical History:  No Pertinent History


Past Surgical History:  No Surgical History


Smoking Status:  Never Smoker


Alcohol Use:  Rarely


Drug Use:  None





General Adult


EDM:


Chief Complaint:  ABDOMINAL PAIN





HPI:


HPI:





Patient is a 26 year old A0 female who presents with left-sided abdominal 

pain.  Patient states that the pain is constant and mild, however it woke her 

from her sleep in the middle of the night/early this morning with 8/10 pain.  

She denies radiation, exacerbating factors, remitting factors.  Patient is 

currently pregnant at 19 weeks and 2 days per her LKMP of 9/10/2021.  Patient 

became aware of her pregnancy status on 12/10/2021.  Recently, patient states s

he had an ultrasound but she is unable to provide details of the clinic.  She 

states on that ultrasound she was informed that she had a large mass and 

decreased fetal movement.  At that time, she was advised to obtain obstetric 

follow-up.  Patient neglected to do so.  She reports morning sickness and 23 

episodes of emesis daily.  She has not taken any medication at home for her pain

or nausea.  In her last pregnancy, the baby was born premature and had to remain

in the NICU for period of time.  She has not established obstetric care in this 

pregnancy as of yet, but she was seen at [facility] for her last 

pregnancy.  Patient denies diarrhea, constipation, vaginal bleeding, increased 

discharge, suprapubic discomfort, dysuria, hematuria, vaginal malodor.





Review of Systems:


Review of Systems:


Constitutional:  Denies fever, chills or generalized weakness


Eyes:  Denies change in visual acuity, visual field deficits or discharge


HENT:  Denies ear pain, nasal congestion or sore throat 


Respiratory:  Denies cough or shortness of breath 


Cardiovascular:  Denies chest pain, palpitations or edema 


GI:  See HPI


: See HPI


Musculoskeletal:  Denies back pain or joint pain 


Integument:  Denies rash or other skin lesion


Neurologic:  Denies headache, focal weakness or sensory changes





Heart Score:


C/O Chest Pain:  No





Current Medications:





Current Medications








 Medications


  (Trade)  Dose


 Ordered  Sig/Sharmila  Start Time


 Stop Time Status Last Admin


Dose Admin


 


 Acetaminophen


  (Tylenol)  650 mg  1X  ONCE  22 14:00


 22 14:01 DC 22 14:02


650 MG


 


 Ondansetron HCl


  (Zofran)  4 mg  1X  ONCE  22 14:00


 22 14:01 DC 22 14:01


4 MG


 


 Sodium Chloride  1,000 ml @ 


 1,000 mls/hr  1X  ONCE  22 13:30


 22 14:29  22 14:02


1,000 MLS/HR











Allergies:


Allergies:





Allergies








Coded Allergies Type Severity Reaction Last Updated Verified


 


  No Known Drug Allergies    10/9/17 No











Physical Exam:


PE:





Constitutional: Well developed, well nourished, no acute distress, non-toxic 

appearance. 


HENT: Normocephalic, atraumatic, bilateral external ears normal, nose normal. 


Eyes: EOMI, conjunctiva normal, no discharge.


Neck: Normal range of motion, no stridor.  


Abdomen: Bowel sounds normal, soft, no tenderness, no palpable masses, no 

pulsatile masses, uterus palpated below level of umbilicus. 


Skin: Warm, dry, no erythema, no rash.  


Extremities: No tenderness, no cyanosis, no clubbing, ROM intact, no edema.  


Neurologic: Alert and oriented x4, steady and symmetrical gait, no focal 

deficits noted.





Current Patient Data:


Labs:





                                Laboratory Tests








Test


 22


13:14 22


13:18 22


13:20


 


Urine Collection Type Unknown   


 


Urine Color Yellow   


 


Urine Clarity Turbid   


 


Urine pH 7.0 (<5.0-8.0)   


 


Urine Specific Gravity


 1.025


(1.000-1.030) 


 





 


Urine Protein


 Negative mg/dL


(NEG-TRACE) 


 





 


Urine Glucose (UA)


 Negative mg/dL


(NEG) 


 





 


Urine Ketones (Stick)


 Negative mg/dL


(NEG) 


 





 


Urine Blood Negative (NEG)   


 


Urine Nitrite Negative (NEG)   


 


Urine Bilirubin Negative (NEG)   


 


Urine Urobilinogen Dipstick


 0.2 mg/dL (0.2


mg/dL) 


 





 


Urine Leukocyte Esterase Large (NEG)   


 


Urine RBC 0 /HPF (0-2)   


 


Urine WBC


 5-10 /HPF


(0-4) 


 





 


Urine Squamous Epithelial


Cells Many /LPF 


 


 





 


Urine Bacteria


 Moderate /HPF


(0-FEW) 


 





 


Urine Mucus Slight /LPF   


 


Bedside Urine HCG, Qualitative


 


 Hcg positive


(Negative) 





 


White Blood Count


 


 


 9.0 x10^3/uL


(4.0-11.0)


 


Red Blood Count


 


 


 3.81 x10^6/uL


(3.50-5.40)


 


Hemoglobin


 


 


 12.1 g/dL


(12.0-15.5)


 


Hematocrit


 


 


 34.9 %


(36.0-47.0)


 


Mean Corpuscular Volume   92 fL () 


 


Mean Corpuscular Hemoglobin   32 pg (25-35) 


 


Mean Corpuscular Hemoglobin


Concent 


 


 35 g/dL


(31-37)


 


Red Cell Distribution Width


 


 


 12.3 %


(11.5-14.5)


 


Platelet Count


 


 


 273 x10^3/uL


(140-400)


 


Neutrophils (%) (Auto)   72 % (31-73) 


 


Lymphocytes (%) (Auto)   18 % (24-48) 


 


Monocytes (%) (Auto)   7 % (0-9) 


 


Eosinophils (%) (Auto)   3 % (0-3) 


 


Basophils (%) (Auto)   1 % (0-3) 


 


Neutrophils # (Auto)


 


 


 6.5 x10^3/uL


(1.8-7.7)


 


Lymphocytes # (Auto)


 


 


 1.6 x10^3/uL


(1.0-4.8)


 


Monocytes # (Auto)


 


 


 0.6 x10^3/uL


(0.0-1.1)


 


Eosinophils # (Auto)


 


 


 0.2 x10^3/uL


(0.0-0.7)


 


Basophils # (Auto)


 


 


 0.1 x10^3/uL


(0.0-0.2)


 


Maternal Serum HCG Beta


Subunit 


 


 75311 mIU/mL


(0-5)


 


Sodium Level


 


 


 140 mmol/L


(136-145)


 


Potassium Level


 


 


 3.6 mmol/L


(3.5-5.1)


 


Chloride Level


 


 


 106 mmol/L


()


 


Carbon Dioxide Level


 


 


 22 mmol/L


(21-32)


 


Anion Gap   12 (6-14) 


 


Blood Urea Nitrogen   8 mg/dL (7-20) 


 


Creatinine


 


 


 0.6 mg/dL


(0.6-1.0)


 


Estimated GFR


(Cockcroft-Gault) 


 


 120.8 





 


BUN/Creatinine Ratio   13 (6-20) 


 


Glucose Level


 


 


 92 mg/dL


(70-99)


 


Calcium Level


 


 


 9.4 mg/dL


(8.5-10.1)


 


Total Bilirubin


 


 


 0.2 mg/dL


(0.2-1.0)


 


Aspartate Amino Transf


(AST/SGOT) 


 


 11 U/L (15-37) 





 


Alanine Aminotransferase


(ALT/SGPT) 


 


 22 U/L (14-59) 





 


Alkaline Phosphatase


 


 


 114 U/L


()


 


Total Protein


 


 


 6.9 g/dL


(6.4-8.2)


 


Albumin


 


 


 2.9 g/dL


(3.4-5.0)


 


Albumin/Globulin Ratio   0.7 (1.0-1.7) 


 


Lipase


 


 


 92 U/L


()




















Vital Signs:





                                   Vital Signs








  Date Time  Temp Pulse Resp B/P (MAP) Pulse Ox O2 Delivery O2 Flow Rate FiO2


 


22 14:06  73 16 117/65 (82) 99   


 


22 13:04 98.2     Room Air  





 98.2       











Radiology/Procedures:


Radiology/Procedures:


PROCEDURE: OB LIMITED





EXAM: US OB Limited





CLINICAL HISTORY: LEFT SIDE PAIN . LMP 9/3/2021.





COMPARISON: None available.





TECHNIQUE: Limited transabdominal ultrasound of the uterus was performed.





FINDINGS: 





FETAL NUMBER:        Single


POSITION:      cephalic 





PLACENTA:    


Location:        Posterior


Placentation:   Normal. 


Cord type:      3 vessel cord. 





FETAL ANATOMY:  


FETAL HEART RATE:  145


COMMENTS:    None





Current fetal measurements are:





BPD - 4.6 cm = 19 weeks 6 days


HC -17.1 cm = 19 weeks 5 days


AC - 15.8 cm = 21 weeks 0 days 


FL - 3.0 cm = 19 weeks 1 day  





      


MATERNAL ANATOMY


CERVIX Length (cm):    3.1 cm


UTERUS:        No abnormalities seen.  Placenta appears normal without 

appreciable hemorrhage. 


OVARIES/ADNEXAE:    Normal appearance of the right ovary. In the expected 

location of the left adnexa is a large heterogeneous mass measuring 12.2 x 11.1 

x 8.0 cm with no internal vascularity. Left ovary is not visualized.


CUL-DE-SAC:   No fluid. 


 


HISTORICAL DATES


Last menstrual period:  9/3/2021





CALCULATED DATES


EGA (LMP): 20 weeks 2 days


RAMU (LMP): 6/10/2022





EGA (US): 20 weeks 0 days


RAMU (US): 2022





IMPRESSION: 





1. In the expected location of the left adnexa is a large 12.2 cm mass with no 

internal vascularity. No left ovarian tissue is identified. Considerations would

include a torsed ovary, torsed pedunculated fibroid, endometrioma, or other 

complex cyst/mass. OB/GYN evaluation is recommended.





2. Single living intrauterine pregnancy with measurements corresponding to 20 

weeks 0 days gestation, for RAMU of 2022.





Electronically signed by: Buddy Miller MD (2022 3:42 PM) [facility]





Course & Med Decision Making:


Course & Med Decision Making


Pertinent Labs and Imaging studies reviewed. (See chart for details)





Patient is a 26-year-old female in her fifth pregnancy who presents with left-

sided abdominal pain.  Work-up today will include labs, urinalysis, pelvic 

ultrasound.  She is also provided with IV fluids, IV Zofran and p.o. Tylenol.





Ultrasound reveals a 12.2 cm solid, nonvascularized mass in the region of the 

left adnexa.  I discussed admission versus discharge with Dr. Reyez, OB/GYN, who

advised she establish obstetric care and subsequently obtain referral for high 

risk pregnancy prenatal treatment.  I contacted [facility] high risk OB for 

additional consult.  Dr. Cindi Tompkins agreed to take patient information and 

contact the patient to schedule an appointment for repeat ultrasound and further

evaluation/management.  Dr. Tompkins states she is concerned due to the 

avascularity of the mass, but concurs that emergent transfer is not necessary at

this time.  I stressed my concern for patient follow-up, as she has already 

proven herself to be unreliable at this point.  Dr. Tompkins agrees and will 

contact her office tomorrow to schedule an appointment for the patient.





I informed patient of findings and answered all questions.  Patient was given 

return precautions.  She understands and is agreeable to discharge plan and 

follow-up care.





Dragon Disclaimer:


Dragon Disclaimer:


This electronic medical record was generated, in whole or in part, using a voice

recognition dictation system.





Departure


Departure


Impression:  


   Primary Impression:  


   Pregnancy with less than 22 weeks gestation


   Additional Impressions:  


   Abdominal mass, LUQ (left upper quadrant)


   High risk pregnancy due to history of  labor


   Qualified Codes:  O09.219 - Supervision of pregnancy with history of pre-term

   labor, unspecified trimester


   Urinary tract infection


   Qualified Codes:  N30.00 - Acute cystitis without hematuria


Disposition:  01 HOME / SELF CARE / HOMELESS


Condition:  STABLE


Referrals:  


NO PCP (PCP)


Patient Instructions:  Abdominal Pain During Pregnancy, Easy-to-Read, Morning 

Sickness, Easy-to-Read, Pregnancy - Urinary Tract Infection





Additional Instructions:  


Hoy lo vieron en la igor de emergencias para evaluar powell dolor abdominal. Se le 

realiz marlen ecografa que muestra a powell beb en buen estado de jimbo. Henri hay

marlen masa de 12,2 cm en el abdomen cinthya que probablemente sea el origen del 

dolor. Habl con la Dra. Cindi Tompkins en [facility], quien es 

especialista en embarazos de alto riesgo. Ha accedido a que powell consultorio se 

comunique con usted para programar marlen elis de seguimiento. Se comunicarn con 

usted maana para programar esta elis. Es muy importante que responda a esta 

llamada y violeta a los especialistas all. Si tiene ms inquietudes o desarrolla 

nuevos sntomas, no dude en regresar a la igor de emergencias.





Clendenin Tylenol segn las instrucciones de la caja para powell dolor. Zofran se puede 

yovani para las nuseas/vmitos. Clendenin el antibitico dos veces al da reuben 7 

garcia.





INSTRUCCIONES GENERALES DE LUIS DEL DEPARTAMENTO DE EMERGENCIA





Rebecca por venir maria elena al Departamento de Emergencias (ED) de [facility] y confiarnos powell atencin. Confiamos en que haya tenido marlen experiencia 

positiva en nuestro Departamento de Emergencias. Si desea hablar con la gerencia

del departamento, puede llamar al director al [phone].





EDUARDO INSTRUCCIONES DE SEGUIMIENTO SON LAS SIGUIENTES:


1. Oscar un seguimiento con powell mdico de atencin primaria. Si no tiene un mdico

de cabecera, solicite marlen lista de recursos de mdicos o clnicas que puedan ayu

darlo con la atencin de seguimiento.


2. El proveedor de emergencia ferreira interpretado eduardo estudios de imgenes, si se 

ordenaron. El especialista en imgenes de radiologa tambin los janie. Si hay 

un cambio en los hallazgos, se le notificar en 48 horas cuando sea posible.


3. Si se ha realizado marlen prueba de laboratorio o un cultivo, se revisarn eduardo 

resultados y se le notificar si necesita un cambio en el tratamiento.


4. Siga las instrucciones verbalizadas y consulte las copias impresas si es n

ecesario.





INSTRUCCIONES E INFORMACIN ADICIONALES:


1. Powell atencin hoy ferreira sido supervisada por un mdico especialmente capacitado en

atencin de emergencia. Muchos problemas requieren ms de marlen evaluacin para un

diagnstico y tratamiento completos. Le recomendamos que programe powell elis de 

seguimiento segn lo recomendado para garantizar el tratamiento completo de powell 

enfermedad o lesin. Si no puede obtener atencin de seguimiento y contina 

teniendo un problema, o si powell condicin empeora, le recomendamos que regrese al 

servicio de urgencias.


2. No podemos determinar de manera loyd powell condicin por telfono ni podemos 

justin consejos mdicos slidos por telfono. Por estas razones de seguridad, si 

llama para pedir consejo mdico, le pediremos que vaya al servicio de urgencias 

para marlen evaluacin adicional.


3. Si tiene alguna pregunta sobre estas instrucciones de luis, llame al ED al 

[phone].





INFORMACIN DE SEGURIDAD:


En inters de la seguridad, el bienestar y la prevencin de lesiones; le 

recomendamos que use powell cinturn de seguridad, si fuma; bastante fumador, y 

alentamos a la adam a usar un arcadio protector para andar en bicicleta y otros

eventos deportivos que presenten un mayor riesgo de lesiones en la ponce.





SI EDUARDO SNTOMAS EMPEORAN O SE DESARROLLAN NUEVOS SNTOMAS, O SI TIENE 

PREOCUPACIONES SOBRE POWELL CONDICIN; O SI POWELL CONDICIN EMPEORA MIENTRAS ESPERA POWELL 

ELIS DE SEGUIMIENTO; PNGASE EN CONTACTO CON POWELL MDICO DE ATENCIN PRIMARIA, EL 

MDICO CUYO NOMBRE Y NMERO LE DIERON, O REGRESE AL ED INMEDIATAMENTE.


Scripts


Nitrofurantoin Macrocrystal (NITROFURANTOIN) 100 Mg Capsule


1 CAP PO BID for 7 Days, #14 CAP


   Prov: JAYNA CALDWELL         22 


Ondansetron (ONDANSETRON ODT) 4 Mg Tab.rapdis


1 TAB PO PRN Q6-8HRS, #30 TAB


   Prov: JAYNA CALDWELL         22











JAYNA CALDWELL              2022 14:12

## 2022-01-23 NOTE — RAD
EXAM: US OB Limited



CLINICAL HISTORY: LEFT SIDE PAIN . LMP 9/3/2021.



COMPARISON: None available.



TECHNIQUE: Limited transabdominal ultrasound of the uterus was performed.



FINDINGS: 



FETAL NUMBER:        Single

POSITION:      cephalic 



PLACENTA:    

Location:        Posterior

Placentation:   Normal. 

Cord type:      3 vessel cord. 



FETAL ANATOMY:  

FETAL HEART RATE:  145

COMMENTS:    None



Current fetal measurements are:



BPD - 4.6 cm = 19 weeks 6 days

HC -17.1 cm = 19 weeks 5 days

AC - 15.8 cm = 21 weeks 0 days 

FL - 3.0 cm = 19 weeks 1 day  



      

MATERNAL ANATOMY

CERVIX Length (cm):    3.1 cm

UTERUS:        No abnormalities seen.  Placenta appears normal without appreciable hemorrhage. 

OVARIES/ADNEXAE:    Normal appearance of the right ovary. In the expected location of the left adnexa
 is a large heterogeneous mass measuring 12.2 x 11.1 x 8.0 cm with no internal vascularity. Left ovar
y is not visualized.

CUL-DE-SAC:   No fluid. 

 

HISTORICAL DATES

Last menstrual period:  9/3/2021



CALCULATED DATES

EGA (LMP): 20 weeks 2 days

RAMU (LMP): 6/10/2022



EGA (US): 20 weeks 0 days

RAMU (US): 6/12/2022



IMPRESSION: 



1. In the expected location of the left adnexa is a large 12.2 cm mass with no internal vascularity. 
No left ovarian tissue is identified. Considerations would include a torsed ovary, torsed pedunculate
d fibroid, endometrioma, or other complex cyst/mass. OB/GYN evaluation is recommended.



2. Single living intrauterine pregnancy with measurements corresponding to 20 weeks 0 days gestation,
 for RAMU of 6/12/2022.



Electronically signed by: Buddy Miller MD (1/23/2022 3:42 PM) [facility]